# Patient Record
(demographics unavailable — no encounter records)

---

## 2024-10-28 NOTE — PHYSICAL EXAM
[General Appearance - Alert] : alert [General Appearance - In No Acute Distress] : in no acute distress [General Appearance - Well Nourished] : well nourished [General Appearance - Well Developed] : well developed [General Appearance - Well-Appearing] : healthy appearing [Oriented To Time, Place, And Person] : oriented to person, place, and time [Impaired Insight] : insight and judgment were intact [Affect] : the affect was normal [Mood] : the mood was normal [Abnormal Walk] : normal gait [] : no respiratory distress [Respiration, Rhythm And Depth] : normal respiratory rhythm and effort [Exaggerated Use Of Accessory Muscles For Inspiration] : no accessory muscle use [FreeTextEntry1] : Reports swelling but fROM observed on video - hands. Could not evaluate knees/ Last in person visit-  PIP on right and left 2,3 nt w/ otherwise NO overt joint swelling w/ fROM throughout; In 2020: R 2-3 mild dactylitis PIP/ L 3 and mild plantar fascia ttt - otherwise fROM throughout;
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
## 2024-10-28 NOTE — DATA REVIEWED
[FreeTextEntry1] : Labs: \par  11/19 HLAB27 neg \par  9/19 minimally elevated ESR 17, CRP 1.43 and nl CBC, CMP, HbA1c, neg ACE\par  \par  7/19 CRP 1.33, C3 216, neg ELLEN/ RF/ CCP (all other studies not done)\par  \par  Diagnostics:  \par  11/19 Hips/ SI joints fine on Xray\par  MRI LS 7/18 w/ diffuse muscle spasm and HNP mutli level \par  MRI C spine 7/18 diffuse arthralgias\par  \par  MRI R knee 11/17 nl, no internal derangement
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
## 2024-10-28 NOTE — REVIEW OF SYSTEMS
[Feeling Tired] : feeling tired [Abdominal Pain] : abdominal pain [Constipation] : constipation [Arthralgias] : arthralgias [Joint Pain] : joint pain [Joint Stiffness] : joint stiffness [As Noted in HPI] : as noted in HPI [Skin Lesions] : skin lesion [Skin Wound] : skin wound [Sleep Disturbances] : sleep disturbances [Anxiety] : anxiety [Depression] : depression [Negative] : Heme/Lymph [Feeling Poorly] : not feeling poorly [Recent Weight Gain (___ Lbs)] : no recent weight gain [Recent Weight Loss (___ Lbs)] : no recent weight loss [Chest Pain] : no chest pain [Joint Swelling] : no joint swelling [FreeTextEntry2] : > 64 lb wt loss since 2020 feeling okay- stable actually down another 10 lbs (following pregnancy) [FreeTextEntry3] : denies inflammatory eye sx- decreased acuity w/ night vision...  [de-identified] : scalp and ears- still  present + bx psoriasis  [de-identified] : paresthesias better [de-identified] : sleep improved w/ cyclobenzaprine.not off everything and ok.. doesn't want any medications.  Denies depression - despite stress

## 2025-03-04 NOTE — REASON FOR VISIT
[Home] : at home, [unfilled] , at the time of the visit. [Medical Office: ([facility])___] : at the medical office located in  [Telehealth (audio & video)] : This visit was provided via telehealth using real-time 2-way audio visual technology. [Verbal consent obtained from patient] : the patient, [unfilled] [Consultation] : a consultation visit

## 2025-03-05 NOTE — ASSESSMENT
[FreeTextEntry1] : 31 y/o female s/p cholecystectomy and ongoing post prandial pain that has caused her to go to ED x2 and eleavted LFTs, AST/ALT 400s and reommended to have further evaultion with EUS +/- ERCP to evaluate for (CBD stone, pancreatitis, Sphincter of Oddi dysfunction, lesion or mass).   We also discussed giving her new symptoms of loose yellow stools I would order stool testing to rule out any concerns for pancreas insufficiency, C. difficile or infectious process.  We discussed the proposed procedure, preparation and alternatives.  The risks (bleeding, perforation, infection,Pancreatitis) and benefits were discussed with the patient in details.  The patient understands the risks/benefits and agreed to proceed with procedure.   Plan 1. EUS +/- ERCP with Dr Vaughn on 3/11/2025 2. Instructions emailed.  3. Stool testing  I spent 15 minutes reviewing this patient's records and 37 minutes in face to face during this visit. All questions answered.  Patient to call me with any questions.

## 2025-03-05 NOTE — HISTORY OF PRESENT ILLNESS
[FreeTextEntry1] : EXAM: [id number] - MR ABDOMEN WAW IC  - ORDERED BY: ALFREDA MCKINLEY PROCEDURE DATE:  02/12/2025    INTERPRETATION:  CLINICAL INFORMATION: Further assessment of hepatic lesion  COMPARISON: MR abdomen 9/12/2024  CONTRAST/COMPLICATIONS: IV Contrast: Eovist  10 cc administered   0 cc discarded Oral Contrast: NONE .  PROCEDURE: MRI of the abdomen was performed. MRCP was performed.  FINDINGS: LOWER CHEST: Within normal limits.  LIVER: Lobular 1.8 cm hemangioma centrally within the right lobe segment 8 immediately anterior to the IVC corresponds with the previously described abnormality Normal background hepatic parenchymal uptake and excretion of Eovist contrast. BILE DUCTS: Normal caliber. GALLBLADDER: Cholecystectomy. SPLEEN: Within normal limits. PANCREAS: Within normal limits. ADRENALS: Within normal limits. KIDNEYS/URETERS: Within normal limits.  VISUALIZED PORTIONS: Partially imaged approximately 2 cm likely involuting hemorrhagic cyst within right ovary BOWEL: Unobstructed bowel. PERITONEUM: No ascites. VESSELS: Within normal limits. RETROPERITONEUM/LYMPH NODES: No lymphadenopathy. ABDOMINAL WALL: Remote ventral wall postsurgical changes BONES: Mild scoliosis  IMPRESSION: Small hepatic hemangioma. No suspicious hepatic lesion.     --- End of Report --- 
Yes

## 2025-03-27 NOTE — PHYSICAL EXAM
[Alert] : alert [Normal Voice/Communication] : normal voice/communication [Healthy Appearing] : healthy appearing [Sclera] : the sclera and conjunctiva were normal [Hearing Threshold Finger Rub Not [place]] : hearing was normal [Normal Appearance] : the appearance of the neck was normal [No Respiratory Distress] : no respiratory distress [Respiration, Rhythm And Depth] : normal respiratory rhythm and effort [Heart Rate And Rhythm] : heart rate was normal and rhythm regular [Normal S1, S2] : normal S1 and S2 [Murmurs] : no murmurs [Oriented To Time, Place, And Person] : oriented to person, place, and time [Normal Mood] : the mood was normal

## 2025-03-27 NOTE — REVIEW OF SYSTEMS
[Feeling Poorly] : not feeling poorly [Feeling Tired] : not feeling tired [Red Eyes] : eyes not red [Sore Throat] : no sore throat [Hoarseness] : no hoarseness [Chest Pain] : no chest pain [Palpitations] : no palpitations [Shortness Of Breath] : no shortness of breath [Cough] : no cough [As Noted in HPI] : as noted in HPI

## 2025-03-27 NOTE — PHYSICAL EXAM
[Alert] : alert [Normal Voice/Communication] : normal voice/communication [Healthy Appearing] : healthy appearing [Sclera] : the sclera and conjunctiva were normal [Hearing Threshold Finger Rub Not [place]] : hearing was normal [Normal Appearance] : the appearance of the neck was normal [No Respiratory Distress] : no respiratory distress [Respiration, Rhythm And Depth] : normal respiratory rhythm and effort [Heart Rate And Rhythm] : heart rate was normal and rhythm regular [Normal S1, S2] : normal S1 and S2 [Murmurs] : no murmurs [Oriented To Time, Place, And Person] : oriented to person, place, and time [Normal Mood] : the mood was normal

## 2025-03-27 NOTE — ASSESSMENT
[FreeTextEntry1] : Impression: # Post-ERCP Pancreatitis: Clinically improved. Xray performed inpatient 3/15 without evidence of PD stent # Recurrent abdominal pain: Suspect SOD  Plan: - Diet as tolerated - RTC PRN - Follow up with primary GI (Dr. Arrieta)

## 2025-03-27 NOTE — HISTORY OF PRESENT ILLNESS
[FreeTextEntry1] : 32 year old woman with hx of cholecystectomy (2020) presents for follow up after hospitalization of pancreatitis.  Patient reports having recurrent episodes of abdominal pain. She went to [facility] ED 3/2 with abdominal pain, fever, chills and nausea. She was found to have elevated transaminases (normal bili) and an unremarkable US. She was subsequently discharged with recommendation to follow up with her outpatient GI. She was referred to me for concerning for biliary cause of symptoms.  Given elevated liver enzymes, abdominal pain and episodic nature - symptoms were concerning for occult choledocholithiasis vs sphincter of Oddi dysfunction. She underwent an ERCP with sphincterotomy, balloon sweeps and PD stent placement. Subequently afterwards she developed pancreatitis the following day which required hospitalization.   Since discharge she has been doing better.  Still reports decreased PO intake because she is afraid to set. Patient denies fevers, chills, chest pain, SOB, nausea, vomiting, diarrhea, melena, hematochezia, hematemesis, dysphagia, odynophagia, headache, dizziness, abdominal pain and recent travel.

## 2025-03-27 NOTE — HISTORY OF PRESENT ILLNESS
[FreeTextEntry1] : 32 year old woman with hx of cholecystectomy (2020) presents for follow up after hospitalization of pancreatitis.  Patient reports having recurrent episodes of abdominal pain. She went to [facility] ED 3/2 with abdominal pain, fever, chills and nausea. She was found to have elevated transaminases (normal bili) and an unremarkable US. She was subsequently discharged with recommendation to follow up with her outpatient GI. She was referred to me for concerning for biliary cause of symptoms.  Given elevated liver enzymes, abdominal pain and episodic nature - symptoms were concerning for occult choledocholithiasis vs sphincter of Oddi dysfunction. She underwent an ERCP with sphincterotomy, balloon sweeps and PD stent placement. Subequently afterwards she developed pancreatitis the following day which required hospitalization.   Since discharge she has been doing better.  Still reports decreased PO intake because she is afraid to set. Patient denies fevers, chills, chest pain, SOB, nausea, vomiting, diarrhea, melena, hematochezia, hematemesis, dysphagia, odynophagia, headache, dizziness, abdominal pain and recent travel.

## 2025-05-25 NOTE — ASSESSMENT
[FreeTextEntry1] : # Elevated liver enzymes: - Her acute liver injury has largely resolved, with most recent labs (3/18/25) with normal liver chemistries apart from minimally elevated ALT of 30 U/L. We will re-check labs today for trends. - The prior acute liver injury may have been an acute infectious hepatitis or may have been elevations associated with sphincter of Oddi dysfunction for which she has since undergone biliary sphincterotomy (3/11/25). - Although she has a personal and family history of autoimmune disorders, laboratory work-up thus far did not suggest any autoimmune liver disease. Although she has overweight BMI, recent MRI (2/12/25) did not suggest hepatic steatosis, at least radiologically.  # History of diarrhea, now with constipation and abdominal bloating: - Although she has a first-degree family history of IBD, her recent symptoms with constipation are more suggestive of irritable bowel syndrome (IBS) that was previously associated with diarrhea but has now become more constipation predominant. - I encouraged her to try daily fiber supplementation and Miralax daily as needed and to follow up with her GI Dr. Scales if these measures do not alleviate her recent constipation and bloating. She may benefit from prescription IBS treatment. If diarrhea or other potential symptoms of IBD recur, she may also still need colonoscopy, especially as prior fecal calprotectin (3/5/25) was mildly elevated.  # Liver lesion: - Recent MRI abdomen with Eovist (2/12/25) confirms that her right hepatic lobe segment 8 lesion is a small 1.8-1.9 cm hemangioma as also suggested on the prior MRI abdomen. It is stable in size. - We discussed that hepatic hemangiomas are the most common benign liver lesion. Asymptomatic hemangiomas <=5 cm in size do not require further surveillance. Even for larger hemangiomas, prognosis is excellent as complications such as bleeding are very rare and malignant transformation does not occur. - No further routine MRI surveillance indicated unless symptomatic.  Next follow-up: She can be referred back on an as-needed basis.
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
## 2025-05-25 NOTE — REVIEW OF SYSTEMS
[As Noted in HPI] : as noted in HPI [Anxiety] : anxiety [Negative] : Heme/Lymph [Abdominal Pain] : abdominal pain [Constipation] : constipation
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
## 2025-05-25 NOTE — PHYSICAL EXAM
[Non-Tender] : non-tender [Smooth] : smooth [General Appearance - Alert] : alert [General Appearance - In No Acute Distress] : in no acute distress [General Appearance - Well Nourished] : well nourished [General Appearance - Well Developed] : well developed [General Appearance - Well-Appearing] : healthy appearing [Sclera] : the sclera and conjunctiva were normal [Hearing Threshold Finger Rub Not [place]] : hearing was normal [Oropharynx] : the oropharynx was normal [Neck Appearance] : the appearance of the neck was normal [Neck Cervical Mass (___cm)] : no neck mass was observed [Jugular Venous Distention Increased] : there was no jugular-venous distention [Respiration, Rhythm And Depth] : normal respiratory rhythm and effort [Exaggerated Use Of Accessory Muscles For Inspiration] : no accessory muscle use [Auscultation Breath Sounds / Voice Sounds] : lungs were clear to auscultation bilaterally [Heart Rate And Rhythm] : heart rate was normal and rhythm regular [Heart Sounds] : normal S1 and S2 [Murmurs] : no murmurs [Edema] : there was no peripheral edema [Bowel Sounds] : normal bowel sounds [Abdomen Soft] : soft [Abdomen Tenderness] : non-tender [Abdomen Mass (___ Cm)] : no abdominal mass palpated [Abnormal Walk] : normal gait [Nail Clubbing] : no clubbing  or cyanosis of the fingernails [Involuntary Movements] : no involuntary movements were seen [Skin Color & Pigmentation] : normal skin color and pigmentation [Skin Turgor] : normal skin turgor [] : no rash [Oriented To Time, Place, And Person] : oriented to person, place, and time [Impaired Insight] : insight and judgment were intact [Affect] : the affect was normal [Mood] : the mood was normal [Memory Recent] : recent memory was not impaired [Memory Remote] : remote memory was not impaired [Scleral Icterus] : No Scleral Icterus [Spider Angioma] : No spider angioma(s) were observed [Abdominal  Ascites] : no ascites [Splenomegaly] : no splenomegaly [Caput Medusae] : no caput medusae observed [Jaundice] : No jaundice [Palmar Erythema] : no Palmar Erythema [FreeTextEntry1] : +tattoos

## 2025-05-25 NOTE — PHYSICAL EXAM
[Non-Tender] : non-tender [Smooth] : smooth [General Appearance - Alert] : alert [General Appearance - In No Acute Distress] : in no acute distress [General Appearance - Well Nourished] : well nourished [General Appearance - Well Developed] : well developed [General Appearance - Well-Appearing] : healthy appearing [Sclera] : the sclera and conjunctiva were normal [Hearing Threshold Finger Rub Not [place]] : hearing was normal [Oropharynx] : the oropharynx was normal [Neck Appearance] : the appearance of the neck was normal [Neck Cervical Mass (___cm)] : no neck mass was observed [Jugular Venous Distention Increased] : there was no jugular-venous distention [Respiration, Rhythm And Depth] : normal respiratory rhythm and effort [Exaggerated Use Of Accessory Muscles For Inspiration] : no accessory muscle use [Auscultation Breath Sounds / Voice Sounds] : lungs were clear to auscultation bilaterally [Heart Rate And Rhythm] : heart rate was normal and rhythm regular [Heart Sounds] : normal S1 and S2 [Murmurs] : no murmurs [Edema] : there was no peripheral edema [Bowel Sounds] : normal bowel sounds [Abdomen Soft] : soft [Abdomen Tenderness] : non-tender [Abdomen Mass (___ Cm)] : no abdominal mass palpated [Abnormal Walk] : normal gait [Nail Clubbing] : no clubbing  or cyanosis of the fingernails [Involuntary Movements] : no involuntary movements were seen [Skin Color & Pigmentation] : normal skin color and pigmentation [Skin Turgor] : normal skin turgor [] : no rash [Oriented To Time, Place, And Person] : oriented to person, place, and time [Impaired Insight] : insight and judgment were intact [Affect] : the affect was normal [Mood] : the mood was normal [Memory Recent] : recent memory was not impaired [Memory Remote] : remote memory was not impaired [Scleral Icterus] : No Scleral Icterus [Spider Angioma] : No spider angioma(s) were observed [Abdominal  Ascites] : no ascites [Splenomegaly] : no splenomegaly [Caput Medusae] : no caput medusae observed [Jaundice] : No jaundice [Palmar Erythema] : no Palmar Erythema [FreeTextEntry1] : +tattoos